# Patient Record
Sex: FEMALE | Race: WHITE | NOT HISPANIC OR LATINO | Employment: OTHER | URBAN - METROPOLITAN AREA
[De-identification: names, ages, dates, MRNs, and addresses within clinical notes are randomized per-mention and may not be internally consistent; named-entity substitution may affect disease eponyms.]

---

## 2021-01-23 DIAGNOSIS — Z23 ENCOUNTER FOR IMMUNIZATION: ICD-10-CM

## 2021-12-04 ENCOUNTER — HOSPITAL ENCOUNTER (OUTPATIENT)
Facility: CLINIC | Age: 76
Discharge: HOME | End: 2021-12-04
Attending: FAMILY MEDICINE
Payer: MEDICARE

## 2021-12-04 VITALS
DIASTOLIC BLOOD PRESSURE: 70 MMHG | WEIGHT: 175 LBS | HEART RATE: 115 BPM | BODY MASS INDEX: 29.16 KG/M2 | RESPIRATION RATE: 17 BRPM | OXYGEN SATURATION: 95 % | HEIGHT: 65 IN | SYSTOLIC BLOOD PRESSURE: 130 MMHG | TEMPERATURE: 99.2 F

## 2021-12-04 DIAGNOSIS — J20.9 ACUTE BRONCHITIS, UNSPECIFIED ORGANISM: Primary | ICD-10-CM

## 2021-12-04 PROCEDURE — 99203 OFFICE O/P NEW LOW 30 MIN: CPT | Performed by: FAMILY MEDICINE

## 2021-12-04 RX ORDER — ALBUTEROL SULFATE 90 UG/1
2 INHALANT RESPIRATORY (INHALATION) EVERY 6 HOURS PRN
Qty: 1 EACH | Refills: 0 | Status: SHIPPED | OUTPATIENT
Start: 2021-12-04 | End: 2023-12-15 | Stop reason: ALTCHOICE

## 2021-12-04 RX ORDER — BENZONATATE 100 MG/1
100 CAPSULE ORAL 3 TIMES DAILY PRN
Qty: 30 CAPSULE | Refills: 0 | Status: SHIPPED | OUTPATIENT
Start: 2021-12-04 | End: 2021-12-14

## 2021-12-04 RX ORDER — AZITHROMYCIN 250 MG/1
TABLET, FILM COATED ORAL
Qty: 6 TABLET | Refills: 0 | Status: SHIPPED | OUTPATIENT
Start: 2021-12-04 | End: 2023-12-15 | Stop reason: ALTCHOICE

## 2021-12-04 ASSESSMENT — ENCOUNTER SYMPTOMS
WHEEZING: 1
FEVER: 0
VOMITING: 0
COUGH: 1
FEVER: 1

## 2021-12-04 NOTE — DISCHARGE INSTRUCTIONS
The medicines your doctor told you are correct for you to continue to  take.  I am also going to order a prescription for an inhaler that I use for people with asthma and when they wheeze.  Also prescription for Tessalon Perles to help you with the tickle in the back of her throat.    I am ordering an antibiotic Z-Jesus Alberto at this moment however I recommend you holding off for at least the next day or 2 to see if the medicines that we are prescribing now help.  If you not feeling any improvement in the next 2 days double check with your family doctor to see if it is okay to take the Z-Jesus Alberto.

## 2021-12-04 NOTE — ED PROVIDER NOTES
History  Chief Complaint   Patient presents with   • Cough   • Wheezing       History provided by:  Patient   used: No    Cough  Cough characteristics: PND, no chest congestion.  Severity:  Moderate  Smoker: no    Relieved by: advil x 2 this AM.  Exacerbated by: swallowing.  Associated symptoms: wheezing    Associated symptoms: no chest pain, no fever and no rash    Associated symptoms comment:  HA resolved with advil  Wheezing  Associated symptoms: cough    Associated symptoms: no chest pain, no fever and no rash        History reviewed. No pertinent past medical history.    No past surgical history on file.    History reviewed. No pertinent family history.    Social History     Tobacco Use   • Smoking status: Not on file   • Smokeless tobacco: Not on file   Substance Use Topics   • Alcohol use: Not on file   • Drug use: Not on file       Review of Systems   Constitutional: Negative for fever.   Respiratory: Positive for cough.    Cardiovascular: Negative for chest pain.   Gastrointestinal: Negative for vomiting.   Skin: Negative for rash.   Neurological: Negative for syncope.       Physical Exam  ED Triage Vitals [12/04/21 0915]   Temp Heart Rate Resp BP SpO2   37.3 °C (99.2 °F) (!) 115 17 130/70 95 %      Temp Source Heart Rate Source Patient Position BP Location FiO2 (%) (Set)   Oral Monitor Sitting Left upper arm --       Physical Exam  Vitals reviewed.   Constitutional:       General: She is not in acute distress.     Appearance: She is not toxic-appearing.   HENT:      Right Ear: Tympanic membrane and ear canal normal.      Left Ear: Tympanic membrane and ear canal normal.      Mouth/Throat:      Mouth: Mucous membranes are moist.      Pharynx: No oropharyngeal exudate or posterior oropharyngeal erythema.   Eyes:      General: No scleral icterus.        Right eye: No discharge.         Left eye: No discharge.   Cardiovascular:      Rate and Rhythm: Normal rate and regular rhythm.       Heart sounds: No murmur heard.      Pulmonary:      Effort: Pulmonary effort is normal. No respiratory distress.      Breath sounds: No wheezing or rales.   Skin:     General: Skin is warm and dry.      Findings: No rash.           Procedures  Procedures    UC Course  Clinical Impressions as of 12/04/21 0941   Acute bronchitis, unspecified organism       [facility]               Vasu Valverde, DO  12/04/21 1307       Vasu Valverde, DO  12/04/21 1308

## 2021-12-08 ENCOUNTER — HOSPITAL ENCOUNTER (OUTPATIENT)
Facility: CLINIC | Age: 76
Discharge: HOME | End: 2021-12-08
Attending: INTERNAL MEDICINE
Payer: MEDICARE

## 2021-12-08 ENCOUNTER — APPOINTMENT (OUTPATIENT)
Dept: RADIOLOGY | Age: 76
End: 2021-12-08
Attending: INTERNAL MEDICINE
Payer: MEDICARE

## 2021-12-08 VITALS
DIASTOLIC BLOOD PRESSURE: 78 MMHG | RESPIRATION RATE: 18 BRPM | SYSTOLIC BLOOD PRESSURE: 128 MMHG | OXYGEN SATURATION: 97 % | TEMPERATURE: 98.5 F | HEART RATE: 81 BPM

## 2021-12-08 DIAGNOSIS — J18.9 COMMUNITY ACQUIRED PNEUMONIA OF RIGHT LOWER LOBE OF LUNG: Primary | ICD-10-CM

## 2021-12-08 PROCEDURE — 71046 X-RAY EXAM CHEST 2 VIEWS: CPT | Performed by: INTERNAL MEDICINE

## 2021-12-08 PROCEDURE — 99213 OFFICE O/P EST LOW 20 MIN: CPT | Performed by: INTERNAL MEDICINE

## 2021-12-08 RX ORDER — PREDNISONE 5 MG/1
40 TABLET ORAL ONCE
Status: COMPLETED | OUTPATIENT
Start: 2021-12-08 | End: 2021-12-08

## 2021-12-08 RX ORDER — AMOXICILLIN AND CLAVULANATE POTASSIUM 875; 125 MG/1; MG/1
1 TABLET, FILM COATED ORAL 2 TIMES DAILY
Qty: 16 TABLET | Refills: 0 | Status: SHIPPED | OUTPATIENT
Start: 2021-12-08 | End: 2021-12-16

## 2021-12-08 RX ORDER — PREDNISONE 20 MG/1
20 TABLET ORAL 2 TIMES DAILY
Qty: 10 TABLET | Refills: 0 | Status: SHIPPED | OUTPATIENT
Start: 2021-12-08 | End: 2023-12-15 | Stop reason: ALTCHOICE

## 2021-12-08 RX ADMIN — PREDNISONE 40 MG: 5 TABLET ORAL at 12:01

## 2021-12-08 ASSESSMENT — ENCOUNTER SYMPTOMS
HEADACHES: 1
FEVER: 1
WHEEZING: 1
COUGH: 1

## 2021-12-08 NOTE — DISCHARGE INSTRUCTIONS
In the office you did have wheezing and reactive airways.  You were given prednisone 40 mg in the office.  Continue the prednisone starting tomorrow twice daily with food.  Also continue your inhaler to inhalations every 4-6 hours as needed for the wheezing.  Due to the abnormal x-ray showing perhaps an infiltrate in the right lung base I am adding a second antibiotic to cover all bases related to infection.  Please start the new antibiotic but continue and complete the azithromycin as prescribed by Dr. Holman.  Please take the new antibiotic with food.  If you find that your symptoms are worsening over the next 24 to 48 hours please call your doctor or go to the nearest emergency room.  A follow-up chest x-ray is recommended in the next 6 weeks to ensure resolution of the visualized abnormality.  I will attach the official x-ray report your discharge papers and follow-up with your primary care doctor is needed.

## 2021-12-08 NOTE — ED PROVIDER NOTES
History  Chief Complaint   Patient presents with   • Cough   • Wheezing     + flu vaccine  Covid x 3, M    NKDA      History provided by:  Patient   used: No    Cough  Cough characteristics:  Non-productive and productive  Duration:  1 week  Smoker: no    Relieved by: advil and tylenol.  Worsened by:  Nothing  Ineffective treatments: Mucinex, azithro, inhaler.  Associated symptoms: fever, headaches and wheezing    Associated symptoms: no ear pain and no rash    Associated symptoms comment:  99.3 this AM  Wheezing  Associated symptoms: cough, fever and headaches    Associated symptoms: no ear pain and no rash        History reviewed. No pertinent past medical history.    History reviewed. No pertinent surgical history.    History reviewed. No pertinent family history.    Social History     Tobacco Use   • Smoking status: Not on file   • Smokeless tobacco: Not on file   Substance Use Topics   • Alcohol use: Not on file   • Drug use: Not on file       Review of Systems   Constitutional: Positive for fever.   HENT: Negative for ear pain.    Respiratory: Positive for cough and wheezing.    Skin: Negative for rash.   Neurological: Positive for headaches.       Physical Exam  ED Triage Vitals [12/08/21 1135]   Temp Heart Rate Resp BP SpO2   36.9 °C (98.5 °F) 81 18 128/78 94 %      Temp Source Heart Rate Source Patient Position BP Location FiO2 (%) (Set)   Oral Monitor Sitting Left upper arm --       Physical Exam  Vitals reviewed.   Constitutional:       Appearance: She is not ill-appearing.      Comments: + wheezy cough   HENT:      Right Ear: Tympanic membrane, ear canal and external ear normal. There is no impacted cerumen.      Left Ear: Tympanic membrane, ear canal and external ear normal. There is no impacted cerumen.      Mouth/Throat:      Mouth: Mucous membranes are moist.      Pharynx: No oropharyngeal exudate or posterior oropharyngeal erythema.   Eyes:      General: No scleral icterus.         Right eye: No discharge.         Left eye: No discharge.   Cardiovascular:      Rate and Rhythm: Normal rate and regular rhythm.   Pulmonary:      Breath sounds: Wheezing, rhonchi and rales present.      Comments: Rales left base  Skin:     General: Skin is warm and dry.      Findings: No rash.   Neurological:      Mental Status: She is alert.           Procedures  Procedures    UC Course  Clinical Impressions as of 12/08/21 1253   Community acquired pneumonia of right lower lobe of lung       MDM  Number of Diagnoses or Management Options  Community acquired pneumonia of right lower lobe of lung  Diagnosis management comments: On clinical exam she was wheezing and sounded like a possible consolidation at the left lower lobe but x-rays show what appears to be a probable right basilar infiltrate.  Nonetheless adding a second antibiotic in the form of Augmentin.  Treating the reactive airways with a course of prednisone starting in the office today.  Follow-up x-ray needed.  Please see the disposition for full care plan.  Patient was recommended to call her doctor or go to the ER over the next couple of days if there is interval worsening of her symptoms.       Amount and/or Complexity of Data Reviewed  Tests in the radiology section of CPT®: ordered and reviewed                   Vasu Valverde DO  12/08/21 1259

## 2022-03-30 ENCOUNTER — HOSPITAL ENCOUNTER (OUTPATIENT)
Dept: RADIOLOGY | Age: 77
Discharge: HOME | End: 2022-03-30
Attending: INTERNAL MEDICINE
Payer: COMMERCIAL

## 2022-03-30 ENCOUNTER — TRANSCRIBE ORDERS (OUTPATIENT)
Dept: REGISTRATION | Age: 77
End: 2022-03-30

## 2022-03-30 DIAGNOSIS — M54.50 LOW BACK PAIN: ICD-10-CM

## 2022-03-30 DIAGNOSIS — M54.50 LOW BACK PAIN: Primary | ICD-10-CM

## 2022-03-30 PROCEDURE — 72072 X-RAY EXAM THORAC SPINE 3VWS: CPT

## 2022-03-30 PROCEDURE — 72110 X-RAY EXAM L-2 SPINE 4/>VWS: CPT

## 2023-12-15 ENCOUNTER — HOSPITAL ENCOUNTER (OUTPATIENT)
Facility: CLINIC | Age: 78
Discharge: HOME | End: 2023-12-15
Attending: FAMILY MEDICINE
Payer: COMMERCIAL

## 2023-12-15 VITALS
DIASTOLIC BLOOD PRESSURE: 73 MMHG | HEART RATE: 79 BPM | RESPIRATION RATE: 18 BRPM | SYSTOLIC BLOOD PRESSURE: 119 MMHG | TEMPERATURE: 97.7 F | OXYGEN SATURATION: 96 %

## 2023-12-15 DIAGNOSIS — H10.31 ACUTE CONJUNCTIVITIS OF RIGHT EYE, UNSPECIFIED ACUTE CONJUNCTIVITIS TYPE: Primary | ICD-10-CM

## 2023-12-15 PROCEDURE — 99213 OFFICE O/P EST LOW 20 MIN: CPT | Performed by: FAMILY MEDICINE

## 2023-12-15 RX ORDER — CETIRIZINE HYDROCHLORIDE 10 MG/1
10 TABLET ORAL
COMMUNITY

## 2023-12-15 RX ORDER — GUAIFENESIN 600 MG/1
600 TABLET, EXTENDED RELEASE ORAL
COMMUNITY

## 2023-12-15 RX ORDER — AMLODIPINE BESYLATE 5 MG/1
TABLET ORAL
COMMUNITY
Start: 2023-10-30

## 2023-12-15 RX ORDER — TOBRAMYCIN 3 MG/ML
2 SOLUTION/ DROPS OPHTHALMIC
Qty: 5 ML | Refills: 0 | Status: SHIPPED | OUTPATIENT
Start: 2023-12-15 | End: 2023-12-22

## 2023-12-15 RX ORDER — LISINOPRIL 10 MG/1
TABLET ORAL
COMMUNITY
Start: 2023-10-25

## 2023-12-15 RX ORDER — METOPROLOL TARTRATE 100 MG/1
100 TABLET ORAL 2 TIMES DAILY
COMMUNITY

## 2023-12-15 ASSESSMENT — ENCOUNTER SYMPTOMS
EYE REDNESS: 1
EYE ITCHING: 1
FEVER: 0
EYE DISCHARGE: 1
EYE PAIN: 0
PHOTOPHOBIA: 0
CHILLS: 0
FATIGUE: 0

## 2023-12-15 NOTE — DISCHARGE INSTRUCTIONS
Cool water rinsing recommended. Also, to clean the eye when it has mucous (in the mornings especially) I recommend Baby Shampoo and warm water on a washcloth to clean with the No Tears benefit.      Zaditor- Opthalmic (eye) Antihistamine drops. You can purchase this if Olopatadine eye drops are not covered by your insurance.      No wearing contacts until treatment is completed.     Exercise proper hand hygiene.       Recommending a saline eyewash (with an eye cup for ease of use).    __________________________________________________________    If symptoms persist or worsen further, Ophthalmology follow up recommended.   __________________________________________________________    Please let us know about your experience today!   Your input is truly appreciated and helps Dr. Root and your team at [facility] to continue to provide a superior level of service!   Get Well Soon!

## 2023-12-15 NOTE — ED PROVIDER NOTES
History  Chief Complaint   Patient presents with   • Eye Drainage     RT eye  Pink, crusting over since last night  Work in      Ines is a 79 yo female presenting with right eye discharge and redness  for  1 day.   She works at a  and has had to send childnre home with what was concerning for bacterial conjunctivitis.          History provided by:  Patient   used: No        No past medical history on file.    No past surgical history on file.    No family history on file.         Review of Systems   Constitutional: Negative for chills, fatigue and fever.   Eyes: Positive for discharge, redness and itching. Negative for photophobia, pain and visual disturbance.       Physical Exam  ED Triage Vitals [12/15/23 1035]   Temp Heart Rate Resp BP SpO2   36.5 °C (97.7 °F) 79 18 119/73 96 %      Temp src Heart Rate Source Patient Position BP Location FiO2 (%) (Set)   -- -- -- -- --       Physical Exam  Constitutional:       Appearance: Normal appearance.   Eyes:      General:         Right eye: Discharge present.      Extraocular Movements: Extraocular movements intact.      Conjunctiva/sclera:      Right eye: Right conjunctiva is injected.      Pupils: Pupils are equal, round, and reactive to light.   Skin:     Capillary Refill: Capillary refill takes less than 2 seconds.   Neurological:      General: No focal deficit present.      Mental Status: She is alert and oriented to person, place, and time.           Procedures  Procedures    UC Course       Medical Decision Making  Cool water rinsing recommended. Recommending a saline eyewash (with an eye cup for ease of use).    Tobramycin Rx'ed   Recommending No wearing contacts until treatment is completed.   Urged to exercise proper hand hygiene.     If symptoms persist or worsen further, Ophthalmology follow up recommended.                         Kyle Root,   12/15/23 1047

## 2024-06-02 ENCOUNTER — HOSPITAL ENCOUNTER (OUTPATIENT)
Facility: CLINIC | Age: 79
Discharge: HOME | End: 2024-06-02
Attending: FAMILY MEDICINE
Payer: COMMERCIAL

## 2024-06-02 ENCOUNTER — APPOINTMENT (OUTPATIENT)
Dept: RADIOLOGY | Age: 79
End: 2024-06-02
Attending: FAMILY MEDICINE
Payer: COMMERCIAL

## 2024-06-02 VITALS
OXYGEN SATURATION: 96 % | SYSTOLIC BLOOD PRESSURE: 140 MMHG | HEART RATE: 88 BPM | RESPIRATION RATE: 18 BRPM | TEMPERATURE: 97.9 F | DIASTOLIC BLOOD PRESSURE: 87 MMHG

## 2024-06-02 DIAGNOSIS — R07.81 RIB PAIN ON RIGHT SIDE: Primary | ICD-10-CM

## 2024-06-02 LAB
BACTERIA, POC: 0
BILIRUBIN, POC: NEGATIVE
BLOOD URINE, POC: NEGATIVE
CLARITY, POC: CLEAR
COLOR, POC: YELLOW
EXPIRATION DATE: ABNORMAL
GLUCOSE URINE, POC: NEGATIVE
KETONES, POC: NEGATIVE
LEUKOCYTE EST, POC: ABNORMAL
Lab: ABNORMAL
NITRITE, POC: NEGATIVE
PH, POC: 5
POCT MANUFACTURER: ABNORMAL
PROTEIN, POC: NEGATIVE
SPECIFIC GRAVITY, POC: 1.01
UROBILINOGEN, POC: 0.2

## 2024-06-02 PROCEDURE — 81002 URINALYSIS NONAUTO W/O SCOPE: CPT | Performed by: FAMILY MEDICINE

## 2024-06-02 PROCEDURE — 71046 X-RAY EXAM CHEST 2 VIEWS: CPT | Performed by: FAMILY MEDICINE

## 2024-06-02 PROCEDURE — 87086 URINE CULTURE/COLONY COUNT: CPT | Performed by: FAMILY MEDICINE

## 2024-06-02 PROCEDURE — 99214 OFFICE O/P EST MOD 30 MIN: CPT | Performed by: FAMILY MEDICINE

## 2024-06-02 ASSESSMENT — ENCOUNTER SYMPTOMS
FREQUENCY: 0
BACK PAIN: 1
NAUSEA: 0
FLANK PAIN: 1
CHEST TIGHTNESS: 0
COUGH: 1
VOMITING: 0
FATIGUE: 0
FEVER: 0
ABDOMINAL PAIN: 0
HEMATURIA: 0
DYSURIA: 0
CHILLS: 0
DIFFICULTY URINATING: 0
SHORTNESS OF BREATH: 0

## 2024-06-02 NOTE — DISCHARGE INSTRUCTIONS
Ice the injured area to help reduce pain and swelling. Wrap a cold source (recommending a bag of frozen peas or frozen corn) in a thin towel. Apply to the injured area for 20 minutes every 1 to 2 hours the first day. Continue this 3 to 4 times a day until the pain and swelling goes away.     Remember,  Ice is Nice, that's why they rhyme, and you can use it ALL the time : )     __________________________________________________________     **Avoid applying heat** __________________________________________________________    For pain:    You may alternate Ibuprofen with Tylenol every 3-4 hours. (Ex: Ibuprofen at 8am, Tylenol at 11am, Ibuprofen at 2pm, etc) as needed.   Ibuprofen 600mg every 6hr, Always with food   (Max daily dose not to exceed 2400mg!)  Tylenol 500mg doses every 4hrs OR 650mg doses every 6hrs (Max daily dose not to exceed 3000mg!)  __________________________________________________________     If symptoms persist or worsen, consider following up with Primary Care Provider and/or an Orthopedic specialist.    __________________________________________________________    Please let us know about your experience today!   Your input is truly appreciated and helps Dr. Root and your team at [facility] to continue to provide a superior level of service!   Get Well Soon!

## 2024-06-02 NOTE — ED PROVIDER NOTES
History  Chief Complaint   Patient presents with    Back Pain     RT side back pain x > 2 weeks  No injury known     Ines is a 79 yo female presenting with right sided back pain ongoing  for  3 weeks.   She c/o an ongoing nagging cough with right lower chest discomfort, h/o PNA .         History provided by:  Patient   used: No    Back Pain  Associated symptoms: no abdominal pain, no dysuria and no fever        No past medical history on file.    No past surgical history on file.    No family history on file.         Review of Systems   Constitutional:  Negative for chills, fatigue and fever.   Respiratory:  Positive for cough. Negative for chest tightness and shortness of breath.    Gastrointestinal:  Negative for abdominal pain, nausea and vomiting.   Genitourinary:  Positive for flank pain. Negative for decreased urine volume, difficulty urinating, dysuria, frequency, hematuria and urgency.   Musculoskeletal:  Positive for back pain.       Physical Exam  ED Triage Vitals [06/02/24 1255]   Temp Heart Rate Resp BP SpO2   36.6 °C (97.9 °F) 88 18 (!) 140/87 96 %      Temp src Heart Rate Source Patient Position BP Location FiO2 (%) (Set)   -- -- -- -- --       Physical Exam  Constitutional:       Appearance: Normal appearance.   Abdominal:      General: Abdomen is flat. There is no distension.      Palpations: Abdomen is soft.      Tenderness: There is no abdominal tenderness. There is no right CVA tenderness, left CVA tenderness or guarding.   Musculoskeletal:      Cervical back: Normal.      Thoracic back: Spasms and tenderness present. No bony tenderness. Decreased range of motion. No scoliosis.      Lumbar back: Normal.        Back:    Neurological:      Mental Status: She is alert.           Procedures  Procedures    UC Course       Medical Decision Making  Tenderness to palpation, no urinary symptoms.   Ongoing cough, but no acute sign of PNA.   Likely Msk  Recommending RICE & NSAIDs, to  consider Chiropractics.   Recommending Orthopedic specialist if symptoms persist/worsen.                        Kyle Root,   06/02/24 8123

## 2024-06-04 LAB
BACTERIA UR CULT: NORMAL
BACTERIA UR CULT: NORMAL

## 2024-07-09 ENCOUNTER — HOSPITAL ENCOUNTER (OUTPATIENT)
Facility: CLINIC | Age: 79
Discharge: HOME | End: 2024-07-09
Attending: HOSPITALIST
Payer: COMMERCIAL

## 2024-07-09 VITALS
OXYGEN SATURATION: 99 % | DIASTOLIC BLOOD PRESSURE: 68 MMHG | TEMPERATURE: 98.1 F | HEART RATE: 60 BPM | SYSTOLIC BLOOD PRESSURE: 143 MMHG

## 2024-07-09 DIAGNOSIS — J06.9 ACUTE URI: Primary | ICD-10-CM

## 2024-07-09 PROCEDURE — 99213 OFFICE O/P EST LOW 20 MIN: CPT

## 2024-07-09 ASSESSMENT — ENCOUNTER SYMPTOMS
NECK PAIN: 0
DIZZINESS: 0
COLOR CHANGE: 0
TROUBLE SWALLOWING: 0
SINUS PRESSURE: 0
RHINORRHEA: 1
NAUSEA: 0
FATIGUE: 0
SINUS PAIN: 0
CHEST TIGHTNESS: 0
LIGHT-HEADEDNESS: 0
NECK STIFFNESS: 0
ACTIVITY CHANGE: 0
APPETITE CHANGE: 0
COUGH: 1
NUMBNESS: 0
SORE THROAT: 0
EYES NEGATIVE: 1
PALPITATIONS: 0
BACK PAIN: 0
WHEEZING: 0
ABDOMINAL PAIN: 0
CHILLS: 0
MYALGIAS: 0
DIARRHEA: 0
HEADACHES: 0
VOMITING: 0
WEAKNESS: 0
SHORTNESS OF BREATH: 0
FEVER: 0

## 2024-07-09 NOTE — DISCHARGE INSTRUCTIONS
Symptomatic Therapy for Viral Upper Respiratory Infections and Seasonal Allergies:  Drink plenty of fluids  Use Nasal saline spray (Flonase- 1 spray each nostril in the morning and again at night) or a Neti-Pot can be helpful to reduce congestion and post-nasal drainage  Oral decongestants such as Sudafed or Mucinex to help with congestion.  Do not use these medications if you have poorly controlled high blood pressure or other heart problems (Coricidin is safe to use if you have high blood pressure).  Oral antihistamines such as claritin, zyrtec, allegra may be used for watery eyes or nose, especially if you were diagnosed with seasonal allergies  Pain relievers such as motrin or tylenol for fever, aches and pains  Eustachian Tube Dysfunction: Check out the Otovent or Eustaci for relief of discomfort from inner ear fluid build-up.    If you have a humidifier, place it next to your bed at night to help loosen any secretions while you sleep.  Utilize warm steam showers to help move mucus out.     Seek re-evaluation for the following:  New onset of fever greater than 101 F  New onset of facial pain  New or worsening symptoms despite the use of OTC remedies or illness lasting greater than 7 days

## 2024-07-09 NOTE — ED PROVIDER NOTES
Emergency Medicine Note  HPI   HISTORY OF PRESENT ILLNESS     Pt started sneezing the whole day about 2 days ago, started with a productive cough and runny nose last night.  Pt denies any fever/chills, SOB, CP, ear pain, abdominal pain, lightheadedness/dizziness, n/v/d.  She hasn't taken anything otc for symptoms.   Hx pna and concerned she might have it again.          Patient History   PAST HISTORY     Reviewed from Nursing Triage:  Tobacco  Allergies  Meds  Problems  Med Hx  Surg Hx  Fam Hx  Soc   Hx      History reviewed. No pertinent past medical history.    History reviewed. No pertinent surgical history.    History reviewed. No pertinent family history.           Review of Systems   REVIEW OF SYSTEMS     Review of Systems   Constitutional:  Negative for activity change, appetite change, chills, fatigue and fever.   HENT:  Positive for postnasal drip, rhinorrhea and sneezing. Negative for congestion, ear pain, sinus pressure, sinus pain, sore throat and trouble swallowing.    Eyes: Negative.    Respiratory:  Positive for cough. Negative for chest tightness, shortness of breath and wheezing.    Cardiovascular:  Negative for chest pain and palpitations.   Gastrointestinal:  Negative for abdominal pain, diarrhea, nausea and vomiting.   Musculoskeletal:  Negative for back pain, gait problem, myalgias, neck pain and neck stiffness.   Skin:  Negative for color change and rash.   Neurological:  Negative for dizziness, weakness, light-headedness, numbness and headaches.   All other systems reviewed and are negative.        VITALS     ED Vitals      Date/Time Temp Pulse Resp BP SpO2 [facility]   07/09/24 1509 36.7 °C (98.1 °F) 60 -- 143/68 99 % [facility]                         Physical Exam   PHYSICAL EXAM     Physical Exam  Vitals and nursing note reviewed.   Constitutional:       General: She is not in acute distress.     Appearance: Normal appearance. She is well-developed. She is not ill-appearing, toxic-appearing or  diaphoretic.   HENT:      Head: Normocephalic and atraumatic.      Right Ear: Tympanic membrane normal.      Left Ear: Tympanic membrane normal.      Nose: Rhinorrhea present. No congestion.      Mouth/Throat:      Mouth: Mucous membranes are moist.      Pharynx: Oropharynx is clear.   Eyes:      Extraocular Movements: Extraocular movements intact.      Conjunctiva/sclera: Conjunctivae normal.      Pupils: Pupils are equal, round, and reactive to light.   Cardiovascular:      Rate and Rhythm: Normal rate and regular rhythm.      Heart sounds: Normal heart sounds. No murmur heard.  Pulmonary:      Effort: Pulmonary effort is normal. No respiratory distress.      Breath sounds: Normal breath sounds. No wheezing or rhonchi.   Musculoskeletal:         General: No tenderness. Normal range of motion.      Cervical back: Normal range of motion.      Right lower leg: No edema.      Left lower leg: No edema.   Lymphadenopathy:      Cervical: No cervical adenopathy.   Skin:     General: Skin is warm and dry.      Capillary Refill: Capillary refill takes less than 2 seconds.   Neurological:      Mental Status: She is alert and oriented to person, place, and time.           PROCEDURES     Procedures     DATA     Results       None                No orders to display       Scoring tools                                  ED Course & MDM   MDM / ED COURSE / CLINICAL IMPRESSION / DISPO     Medical Decision Making  Patient presents with URI symptoms for 2 days.  Patient is well-appearing in NAD. Vitals are stable. Physical exam is unremarkable.   Discussed viral vs bacterial vs allergy etiology  Recommended OTC meds for symptom relief, saline rinses, cough drops/lozenges, chloraseptic spray, fluids and rest.  Discussed red flag s/s , typical course for viral URI, and strict return precautions given.   Advised to follow up with PCP if no improvement or with any new, persisting or worsening symptoms.  Advised patient to report to ED if  develops CP, SOB, difficulty breathing.  Patient given opportunity to ask questions and all were answered.   Patient verbalized understanding and agreeable with plan.              Clinical Impression      Acute URI     _________________       ED Disposition   Discharge                       Mary Beyer FNP  07/09/24 1522